# Patient Record
Sex: FEMALE | Race: AMERICAN INDIAN OR ALASKA NATIVE
[De-identification: names, ages, dates, MRNs, and addresses within clinical notes are randomized per-mention and may not be internally consistent; named-entity substitution may affect disease eponyms.]

---

## 2018-02-03 ENCOUNTER — HOSPITAL ENCOUNTER (EMERGENCY)
Dept: HOSPITAL 5 - ED | Age: 38
LOS: 1 days | Discharge: HOME | End: 2018-02-04
Payer: COMMERCIAL

## 2018-02-03 VITALS — SYSTOLIC BLOOD PRESSURE: 123 MMHG | DIASTOLIC BLOOD PRESSURE: 78 MMHG
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
## 2018-02-03 DIAGNOSIS — F17.200: ICD-10-CM
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
## 2018-02-03 DIAGNOSIS — S39.012A: ICD-10-CM

## 2018-02-03 DIAGNOSIS — S16.1XXA: Primary | ICD-10-CM

## 2018-02-03 PROCEDURE — 99282 EMERGENCY DEPT VISIT SF MDM: CPT
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
## 2020-10-17 ENCOUNTER — HOSPITAL ENCOUNTER (EMERGENCY)
Dept: HOSPITAL 5 - ED | Age: 40
LOS: 1 days | Discharge: HOME | End: 2020-10-18
Payer: COMMERCIAL

## 2020-10-17 DIAGNOSIS — M54.2: ICD-10-CM

## 2020-10-17 DIAGNOSIS — F17.200: ICD-10-CM

## 2020-10-17 DIAGNOSIS — Z79.1: ICD-10-CM

## 2020-10-17 DIAGNOSIS — Z98.890: ICD-10-CM

## 2020-10-17 DIAGNOSIS — R07.89: Primary | ICD-10-CM

## 2020-10-17 DIAGNOSIS — M54.6: ICD-10-CM

## 2020-10-17 DIAGNOSIS — F12.10: ICD-10-CM

## 2020-10-17 DIAGNOSIS — Z79.899: ICD-10-CM

## 2020-10-17 LAB
BASOPHILS # (AUTO): 0.1 K/MM3 (ref 0–0.1)
BASOPHILS NFR BLD AUTO: 0.7 % (ref 0–1.8)
BUN SERPL-MCNC: 9 MG/DL (ref 7–17)
BUN/CREAT SERPL: 11 %
CALCIUM SERPL-MCNC: 9.2 MG/DL (ref 8.4–10.2)
EOSINOPHIL # BLD AUTO: 0 K/MM3 (ref 0–0.4)
EOSINOPHIL NFR BLD AUTO: 0.4 % (ref 0–4.3)
HCT VFR BLD CALC: 36.3 % (ref 30.3–42.9)
HEMOLYSIS INDEX: 6
HGB BLD-MCNC: 11.6 GM/DL (ref 10.1–14.3)
LYMPHOCYTES # BLD AUTO: 2.6 K/MM3 (ref 1.2–5.4)
LYMPHOCYTES NFR BLD AUTO: 20 % (ref 13.4–35)
MCHC RBC AUTO-ENTMCNC: 32 % (ref 30–34)
MCV RBC AUTO: 82 FL (ref 79–97)
MONOCYTES # (AUTO): 0.9 K/MM3 (ref 0–0.8)
MONOCYTES % (AUTO): 7.2 % (ref 0–7.3)
PLATELET # BLD: 243 K/MM3 (ref 140–440)
RBC # BLD AUTO: 4.44 M/MM3 (ref 3.65–5.03)

## 2020-10-17 PROCEDURE — 71045 X-RAY EXAM CHEST 1 VIEW: CPT

## 2020-10-17 PROCEDURE — 36415 COLL VENOUS BLD VENIPUNCTURE: CPT

## 2020-10-17 PROCEDURE — 84484 ASSAY OF TROPONIN QUANT: CPT

## 2020-10-17 PROCEDURE — 93005 ELECTROCARDIOGRAM TRACING: CPT

## 2020-10-17 PROCEDURE — 80048 BASIC METABOLIC PNL TOTAL CA: CPT

## 2020-10-17 PROCEDURE — 84703 CHORIONIC GONADOTROPIN ASSAY: CPT

## 2020-10-17 PROCEDURE — 85025 COMPLETE CBC W/AUTO DIFF WBC: CPT

## 2020-10-17 NOTE — XRAY REPORT
CHEST 1 VIEW



INDICATION: 

Chest Pain



COMPARISON: 

None



FINDINGS:



Support devices: None 



Heart: Normal 



Lungs/Pleura: No acute pulmonary or pleural findings.  





IMPRESSION:

1. No acute disease.



Signer Name: Cas Calhoun MD 

Signed: 10/17/2020 8:13 PM

Workstation Name: [id number]

## 2020-10-17 NOTE — EMERGENCY DEPARTMENT REPORT
ED Chest Pain HPI





- General


Chief Complaint: Chest Pain


Stated Complaint: CHEST PAIN, SOB


Time Seen by Provider: 10/17/20 23:38


Source: patient


Mode of arrival: Ambulatory


Limitations: No Limitations





- History of Present Illness


Initial Comments: 





The patient was evaluated in the emergency department for symptoms described in 

the history of present illness.  He/she was evaluated in the context of the 

global COVID-19 pandemic, which necessitated consideration that the patient 

might be at risk for infection with the virus that causes COVID-19.  Institu

tional protocols and algorithms that pertain to the evaluation of patients at 

risk for COVID-19 are in a state of rapid change based on information released 

by regulatory bodies including the CDC and federal and state organizations.  

These policies and algorithms were followed during the patient's care in the 

emergency department.  Please note that these policies, procedures and 

recommendations changed on a rapid basis.





40-year-old  female presents to the emergency room for acute chest pain 

that started approximately 5 PM.  Patient states that she thought it was gas but

decided to come in to be evaluated.  Patient states that the pain is mostly in 

her left side her neck upper chest and back.  She states that the chest pain is 

tight.  She feels that she has been a little panicky.  She states breathing 

makes it worse and alleviates by applying pressure to her left chest.  Patient d

oes admit to moving furniture and increased stress with home schooling with her 

15-year-old 8-year-old.  [facility].  She states that 

she is not on birth control has not recently travel and no cancers.  Patient 

states that she does take over-the-counter herbal supplements.  She has no past 

medical history takes no medications on a daily basis and has no known drug 

allergies.


MD Complaint: chest pain





- Related Data


                                  Previous Rx's











 Medication  Instructions  Recorded  Last Taken  Type


 


Cyclobenzaprine HCl [Flexeril 5 MG 5 mg PO TID PRN #20 tab 18 Unknown Rx





TAB]    


 


Ibuprofen 800 mg PO Q6H PRN #20 tablet 18 Unknown Rx


 


Ibuprofen [Motrin 600 MG tab] 600 mg PO Q8H PRN #30 tablet 10/17/20 Unknown Rx











                                    Allergies











Allergy/AdvReac Type Severity Reaction Status Date / Time


 


No Known Allergies Allergy   Unverified 18 21:37














Heart Score





- HEART Score


History: Slightly suspicious


EKG: Normal


Age: < 45


Risk factors: No known risk factors


Troponin: < normal limit


HEART Score: 0





ED Review of Systems


ROS: 


Stated complaint: CHEST PAIN, SOB


Other details as noted in HPI





Comment: All other systems reviewed and negative





ED Past Medical Hx





- Past Medical History


Previous Medical History?: Yes


Additional medical history: CLUSTER HEADACHES





- Surgical History


Past Surgical History?: Yes


Additional Surgical History: C-SECTIONSX3





- Social History


Smoking Status: Current Every Day Smoker


Substance Use Type: Alcohol, Marijuana





- Medications


Home Medications: 


                                Home Medications











 Medication  Instructions  Recorded  Confirmed  Last Taken  Type


 


Cyclobenzaprine HCl [Flexeril 5 MG 5 mg PO TID PRN #20 tab 18  Unknown Rx





TAB]     


 


Ibuprofen 800 mg PO Q6H PRN #20 tablet 18  Unknown Rx


 


Ibuprofen [Motrin 600 MG tab] 600 mg PO Q8H PRN #30 tablet 10/17/20  Unknown Rx














ED Physical Exam





- General


Limitations: No Limitations


General appearance: alert, in no apparent distress





- Head


Head exam: Present: atraumatic, normocephalic





- Eye


Eye exam: Present: normal appearance





- ENT


ENT exam: Present: mucous membranes moist





- Neck


Neck exam: Present: normal inspection





- Respiratory


Respiratory exam: Present: normal lung sounds bilaterally.  Absent: respiratory 

distress





- Cardiovascular


Cardiovascular Exam: Present: regular rate, normal rhythm.  Absent: systolic 

murmur, diastolic murmur, rubs, gallop





- GI/Abdominal


GI/Abdominal exam: Present: soft, normal bowel sounds





- Extremities Exam


Extremities exam: Present: normal inspection





- Back Exam


Back exam: Present: normal inspection





- Neurological Exam


Neurological exam: Present: alert, oriented X3





- Psychiatric


Psychiatric exam: Present: normal affect, normal mood





- Skin


Skin exam: Present: warm, dry, intact, normal color.  Absent: rash





ED Course


                                   Vital Signs











  10/17/20





  19:24


 


Temperature 98.5 F


 


Pulse Rate 92 H


 


Respiratory 20





Rate 


 


Blood Pressure 148/90


 


O2 Sat by Pulse 100





Oximetry 














MANDO score





- Mando Score


Age > 65: (0) No


Aspirin use within the Past 7 Days: (0) No


3 or more CAD Risk Factors: (0) No


2 or more Angina events in past 24 hrs: (0) No


Known CAD with more than 50% Stenosis: (0) No


Elevated Cardiac Markers: (0) No


ST Deviation Greater than 0.5mm: (0) No


MANDO Score: 0





ED Medical Decision Making





- Lab Data


Result diagrams: 


                                 10/17/20 19:33





                                 10/17/20 19:33





- EKG Data


EKG shows normal: sinus rhythm


Rate: normal





- Radiology Data


Radiology results: report reviewed





[facility] 


[address] [place] [address] 





XRay Report 


Signed 





 Patient: JASMIN MCCONNELL MR#: 


 [id number] 


 : 1980 Acct:[id number] 





 Age/Sex: 40 / F ADM Date: 10/17/20 





 Loc: ED 


 Attending Dr: 








 Ordering Physician: BERLIN TRINH MD 


 Date of Service: 10/17/20 


 Procedure(s): XR chest 1V ap 


 Accession Number(s): [id number] 





 cc: ED MD ZIGGY 





 Fluoro Time In Minutes: 





 CHEST 1 VIEW 





INDICATION: 


Chest Pain 





COMPARISON: 


None 





FINDINGS: 





Support devices: None 





Heart: Normal 





Lungs/Pleura: No acute pulmonary or pleural findings. 








IMPRESSION: 


1. No acute disease. 





Signer Name: Cas Calhoun MD 


Signed: 10/17/2020 8:13 PM 


Workstation Name: [id number] 








 Transcribed By: TM 


 Dictated By: Cas Calhoun MD 


 Electronically Authenticated By: Cas Calhoun MD 


 Signed Date/Time: 10/17/20 2013 











 DD/DT: 10/17/20 2013 


 TD/TT: 





- Medical Decision Making





40-year-old  female presents to the emergency room for acute chest pain 

that started approximately 5 PM.  Patient states that she thought it was gas but

decided to come in to be evaluated.  Patient states that the pain is mostly in 

her left side her neck upper chest and back.  She states that the chest pain is 

tight.  She feels that she has been a little panicky.  She states breathing 

makes it worse and alleviates by applying pressure to her left chest.  Patient 

does admit to moving furniture and increased stress with home schooling with her

15-year-old 8-year-old.  Patient denies any [employer] contact.  She states that 

she is not on birth control has not recently travel and no cancers.  Patient 

states that she does take over-the-counter herbal supplements.  She has no past 

medical history takes no medications on a daily basis and has no known drug 

allergies.





Chest x-ray within normal limits EKG normal sinus rhythm negative troponin CBC 

and chemistries are within normal limits.  I discussed the patient about 

stressors in her life.  Also reminded patient to be cautious of moving heavy 

objects.  I recommend ibuprofen and to follow-up with her primary care provider 

and cardiologist.  Patient verbalized understanding.


Critical care attestation.: 


If time is entered above; I have spent that time in minutes in the direct care 

of this critically ill patient, excluding procedure time.








ED Disposition


Clinical Impression: 


 Atypical chest pain





Disposition: DC-01 TO HOME OR SELFCARE


Is pt being admited?: No


Does the pt Need Aspirin: No


Condition: Stable


Instructions:  Chest Pain (ED)


Additional Instructions: 


Chest x-ray is within normal limits EKG is normal labs are within normal limits.

 I would recommend increase your fluid intake try taking some ibuprofen I am 

referring you to a cardiologist as well as a primary care provider.


Prescriptions: 


Ibuprofen [Motrin 600 MG tab] 600 mg PO Q8H PRN #30 tablet


 PRN Reason: Pain


Referrals: 


PRIMARY MD CELESTINE [Primary Care Provider] - 3-5 Days


MAGGY RAY MD [Staff Physician] - 3-5 Days


DEEPIKA LOCKHART MD [Staff Physician] - 3-5 Days

## 2020-10-18 VITALS — DIASTOLIC BLOOD PRESSURE: 89 MMHG | SYSTOLIC BLOOD PRESSURE: 135 MMHG
